# Patient Record
Sex: FEMALE | Race: WHITE | NOT HISPANIC OR LATINO | Employment: OTHER | URBAN - METROPOLITAN AREA
[De-identification: names, ages, dates, MRNs, and addresses within clinical notes are randomized per-mention and may not be internally consistent; named-entity substitution may affect disease eponyms.]

---

## 2020-08-25 LAB — HBA1C MFR BLD HPLC: 10.1 %

## 2020-10-01 ENCOUNTER — TELEPHONE (OUTPATIENT)
Dept: INTERNAL MEDICINE CLINIC | Facility: CLINIC | Age: 80
End: 2020-10-01

## 2020-10-13 ENCOUNTER — TELEPHONE (OUTPATIENT)
Dept: INTERNAL MEDICINE CLINIC | Facility: CLINIC | Age: 80
End: 2020-10-13

## 2020-11-11 PROBLEM — I10 ESSENTIAL HYPERTENSION: Status: ACTIVE | Noted: 2020-11-11

## 2020-11-11 PROBLEM — E78.00 PURE HYPERCHOLESTEROLEMIA: Status: ACTIVE | Noted: 2020-11-11

## 2020-11-11 RX ORDER — LISINOPRIL 5 MG/1
5 TABLET ORAL DAILY
COMMUNITY

## 2020-11-11 RX ORDER — GLYBURIDE 2.5 MG/1
7.5 TABLET ORAL DAILY
COMMUNITY
End: 2020-11-12 | Stop reason: SDUPTHER

## 2020-11-11 RX ORDER — FENOFIBRATE 145 MG/1
145 TABLET, COATED ORAL DAILY
COMMUNITY

## 2020-11-12 ENCOUNTER — OFFICE VISIT (OUTPATIENT)
Dept: INTERNAL MEDICINE CLINIC | Facility: CLINIC | Age: 80
End: 2020-11-12
Payer: COMMERCIAL

## 2020-11-12 VITALS
HEIGHT: 64 IN | TEMPERATURE: 98.3 F | DIASTOLIC BLOOD PRESSURE: 80 MMHG | BODY MASS INDEX: 30.05 KG/M2 | WEIGHT: 176 LBS | HEART RATE: 70 BPM | SYSTOLIC BLOOD PRESSURE: 140 MMHG

## 2020-11-12 DIAGNOSIS — E78.00 PURE HYPERCHOLESTEROLEMIA: ICD-10-CM

## 2020-11-12 DIAGNOSIS — I10 ESSENTIAL HYPERTENSION: ICD-10-CM

## 2020-11-12 DIAGNOSIS — E11.9 TYPE 2 DIABETES MELLITUS WITHOUT COMPLICATION, WITHOUT LONG-TERM CURRENT USE OF INSULIN (HCC): Primary | ICD-10-CM

## 2020-11-12 PROCEDURE — 1036F TOBACCO NON-USER: CPT | Performed by: INTERNAL MEDICINE

## 2020-11-12 PROCEDURE — 99213 OFFICE O/P EST LOW 20 MIN: CPT | Performed by: INTERNAL MEDICINE

## 2020-11-12 PROCEDURE — 3079F DIAST BP 80-89 MM HG: CPT | Performed by: INTERNAL MEDICINE

## 2020-11-12 PROCEDURE — 3077F SYST BP >= 140 MM HG: CPT | Performed by: INTERNAL MEDICINE

## 2020-11-12 RX ORDER — BLOOD SUGAR DIAGNOSTIC
STRIP MISCELLANEOUS
COMMUNITY
Start: 2020-11-03 | End: 2020-12-21

## 2020-11-12 RX ORDER — BLOOD-GLUCOSE METER
EACH MISCELLANEOUS
COMMUNITY
Start: 2020-08-11

## 2020-11-12 RX ORDER — GLYBURIDE 2.5 MG/1
7.5 TABLET ORAL DAILY
Qty: 90 TABLET | Refills: 2 | Status: SHIPPED | OUTPATIENT
Start: 2020-11-12 | End: 2021-05-03

## 2020-11-12 RX ORDER — LANCETS
EACH MISCELLANEOUS
COMMUNITY
Start: 2020-11-03

## 2020-12-20 DIAGNOSIS — E11.9 TYPE 2 DIABETES MELLITUS WITHOUT COMPLICATION, WITHOUT LONG-TERM CURRENT USE OF INSULIN (HCC): Primary | ICD-10-CM

## 2020-12-21 RX ORDER — BLOOD SUGAR DIAGNOSTIC
STRIP MISCELLANEOUS
Qty: 100 EACH | Refills: 2 | Status: SHIPPED | OUTPATIENT
Start: 2020-12-21

## 2020-12-23 LAB — HBA1C MFR BLD HPLC: 8.7 %

## 2021-05-02 DIAGNOSIS — E11.9 TYPE 2 DIABETES MELLITUS WITHOUT COMPLICATION, WITHOUT LONG-TERM CURRENT USE OF INSULIN (HCC): ICD-10-CM

## 2021-05-03 RX ORDER — GLYBURIDE 2.5 MG/1
TABLET ORAL
Qty: 270 TABLET | Refills: 3 | Status: SHIPPED | OUTPATIENT
Start: 2021-05-03

## 2022-02-14 ENCOUNTER — TELEPHONE (OUTPATIENT)
Dept: INTERNAL MEDICINE CLINIC | Facility: CLINIC | Age: 82
End: 2022-02-14

## 2022-02-14 NOTE — TELEPHONE ENCOUNTER
Patient transferred to [facility]  Internal Medicine, See CARMINA request in Encounters tab dated 12/14/21, please remove Dr Aleks Hudson as PCP

## 2022-05-26 NOTE — TELEPHONE ENCOUNTER
05/26/22 3:55 PM        Thank you for your request  Your request has been received, reviewed, and the patient chart updated  The PCP has successfully been removed with a patient attribution note  This message will now be completed          Thank you  Raghu Womack

## 2024-10-07 LAB
BUN SERPL-MCNC: 20 MG/DL (ref 7–17)
CALCIUM SERPL-MCNC: 9.4 MG/DL (ref 8.4–10.2)
CHLORIDE SERPL-SCNC: 108 MMOL/L (ref 98–107)
CO2 SERPL-SCNC: 24 MMOL/L (ref 22–30)
EGFR: > 60
ERYTHROCYTE [DISTWIDTH] IN BLOOD BY AUTOMATED COUNT: 13.2 % (ref 11.5–14.5)
GLUCOSE SERPL-MCNC: 115 MG/DL (ref 70–99)
HCT VFR BLD AUTO: 41.2 % (ref 37–47)
HGB BLD-MCNC: 13.7 G/DL (ref 12–16)
MCHC RBC AUTO-ENTMCNC: 33.3 G/DL (ref 33–37)
MCV RBC AUTO: 88.4 FL (ref 81–99)
NRBC BLD AUTO-RTO: 0 %
PLATELET # BLD AUTO: 184 10^3/UL (ref 130–400)
POTASSIUM SERPL-SCNC: 3.8 MMOL/L (ref 3.5–5.1)
SODIUM SERPL-SCNC: 143 MMOL/L (ref 135–145)

## 2024-10-14 ENCOUNTER — HOSPITAL ENCOUNTER (OUTPATIENT)
Dept: HOSPITAL 99 - SDS | Age: 84
LOS: 1 days | Discharge: HOME | End: 2024-10-15
Payer: COMMERCIAL

## 2024-10-14 VITALS — OXYGEN SATURATION: 2 %

## 2024-10-14 VITALS — SYSTOLIC BLOOD PRESSURE: 131 MMHG | DIASTOLIC BLOOD PRESSURE: 60 MMHG | RESPIRATION RATE: 13 BRPM

## 2024-10-14 VITALS — RESPIRATION RATE: 11 BRPM | DIASTOLIC BLOOD PRESSURE: 64 MMHG | SYSTOLIC BLOOD PRESSURE: 146 MMHG

## 2024-10-14 VITALS — DIASTOLIC BLOOD PRESSURE: 61 MMHG | SYSTOLIC BLOOD PRESSURE: 133 MMHG | RESPIRATION RATE: 11 BRPM

## 2024-10-14 VITALS — RESPIRATION RATE: 16 BRPM | DIASTOLIC BLOOD PRESSURE: 57 MMHG | SYSTOLIC BLOOD PRESSURE: 108 MMHG

## 2024-10-14 VITALS — DIASTOLIC BLOOD PRESSURE: 70 MMHG | SYSTOLIC BLOOD PRESSURE: 151 MMHG | RESPIRATION RATE: 16 BRPM

## 2024-10-14 VITALS — DIASTOLIC BLOOD PRESSURE: 68 MMHG | SYSTOLIC BLOOD PRESSURE: 138 MMHG | RESPIRATION RATE: 17 BRPM

## 2024-10-14 VITALS — DIASTOLIC BLOOD PRESSURE: 82 MMHG | SYSTOLIC BLOOD PRESSURE: 157 MMHG | RESPIRATION RATE: 16 BRPM

## 2024-10-14 VITALS — DIASTOLIC BLOOD PRESSURE: 82 MMHG | OXYGEN SATURATION: 2 % | SYSTOLIC BLOOD PRESSURE: 157 MMHG

## 2024-10-14 VITALS — DIASTOLIC BLOOD PRESSURE: 70 MMHG | RESPIRATION RATE: 21 BRPM | SYSTOLIC BLOOD PRESSURE: 137 MMHG

## 2024-10-14 VITALS — DIASTOLIC BLOOD PRESSURE: 64 MMHG | SYSTOLIC BLOOD PRESSURE: 136 MMHG | RESPIRATION RATE: 12 BRPM

## 2024-10-14 VITALS — DIASTOLIC BLOOD PRESSURE: 64 MMHG | SYSTOLIC BLOOD PRESSURE: 142 MMHG

## 2024-10-14 VITALS — BODY MASS INDEX: 27.8 KG/M2

## 2024-10-14 VITALS — SYSTOLIC BLOOD PRESSURE: 146 MMHG | RESPIRATION RATE: 18 BRPM | DIASTOLIC BLOOD PRESSURE: 64 MMHG

## 2024-10-14 VITALS — SYSTOLIC BLOOD PRESSURE: 137 MMHG | DIASTOLIC BLOOD PRESSURE: 66 MMHG

## 2024-10-14 VITALS — SYSTOLIC BLOOD PRESSURE: 135 MMHG | DIASTOLIC BLOOD PRESSURE: 67 MMHG | RESPIRATION RATE: 18 BRPM

## 2024-10-14 DIAGNOSIS — N36.41: ICD-10-CM

## 2024-10-14 DIAGNOSIS — N39.3: ICD-10-CM

## 2024-10-14 DIAGNOSIS — N95.8: ICD-10-CM

## 2024-10-14 DIAGNOSIS — N81.2: Primary | ICD-10-CM

## 2024-10-14 LAB
APTT PPP: 30.9 SEC (ref 23.4–35)
BUN SERPL-MCNC: 16 MG/DL (ref 7–17)
CALCIUM SERPL-MCNC: 8.6 MG/DL (ref 8.4–10.2)
CHLORIDE SERPL-SCNC: 108 MMOL/L (ref 98–107)
CO2 SERPL-SCNC: 23 MMOL/L (ref 22–30)
EGFR: > 60
ERYTHROCYTE [DISTWIDTH] IN BLOOD BY AUTOMATED COUNT: 13.2 % (ref 11.5–14.5)
ESTIMATED CREATININE CLEARANCE: 58 ML/MIN
GLUCOSE - POINT OF CARE: 177 MG/DL (ref 70–99)
GLUCOSE - POINT OF CARE: 188 MG/DL (ref 70–99)
GLUCOSE - POINT OF CARE: 207 MG/DL (ref 70–99)
GLUCOSE - POINT OF CARE: 223 MG/DL (ref 70–99)
GLUCOSE - POINT OF CARE: 232 MG/DL (ref 70–99)
GLUCOSE - POINT OF CARE: 328 MG/DL (ref 70–99)
GLUCOSE SERPL-MCNC: 234 MG/DL (ref 70–99)
HBA1C MFR BLD: 6.8 % (ref 4–5.6)
HCT VFR BLD AUTO: 33.6 % (ref 37–47)
HCT VFR BLD AUTO: 35 % (ref 37–47)
HGB BLD-MCNC: 11.6 G/DL (ref 12–16)
HGB BLD-MCNC: 12.2 G/DL (ref 12–16)
INR PPP: 1.17
MCHC RBC AUTO-ENTMCNC: 34.5 G/DL (ref 33–37)
MCV RBC AUTO: 85.9 FL (ref 81–99)
PLATELET # BLD AUTO: 148 10^3/UL (ref 130–400)
POTASSIUM SERPL-SCNC: 4.3 MMOL/L (ref 3.5–5.1)
PROTHROMBIN TIME: 14.7 SEC (ref 11.4–14.6)
SODIUM SERPL-SCNC: 140 MMOL/L (ref 135–145)

## 2024-10-14 PROCEDURE — 57120 COLPOCLEISIS LE FORT TYPE: CPT

## 2024-10-14 PROCEDURE — 57250 REPAIR RECTUM & VAGINA: CPT

## 2024-10-14 RX ADMIN — INSULIN ASPART 3 UNITS: 100 INJECTION, SOLUTION INTRAVENOUS; SUBCUTANEOUS at 23:02

## 2024-10-14 RX ADMIN — INSULIN ASPART: 100 INJECTION, SOLUTION INTRAVENOUS; SUBCUTANEOUS at 12:53

## 2024-10-14 RX ADMIN — SODIUM CHLORIDE, SODIUM ACETATE ANHYDROUS, SODIUM GLUCONATE, POTASSIUM CHLORIDE, AND MAGNESIUM CHLORIDE 1000: 526; 222; 502; 37; 30 INJECTION, SOLUTION INTRAVENOUS at 06:33

## 2024-10-14 RX ADMIN — INSULIN ASPART 6 UNITS: 100 INJECTION, SOLUTION INTRAVENOUS; SUBCUTANEOUS at 17:54

## 2024-10-14 RX ADMIN — SODIUM CHLORIDE, SODIUM ACETATE ANHYDROUS, SODIUM GLUCONATE, POTASSIUM CHLORIDE, AND MAGNESIUM CHLORIDE 1000: 526; 222; 502; 37; 30 INJECTION, SOLUTION INTRAVENOUS at 20:44

## 2024-10-14 RX ADMIN — SODIUM CHLORIDE, SODIUM ACETATE ANHYDROUS, SODIUM GLUCONATE, POTASSIUM CHLORIDE, AND MAGNESIUM CHLORIDE 1000: 526; 222; 502; 37; 30 INJECTION, SOLUTION INTRAVENOUS at 11:19

## 2024-10-14 RX ADMIN — INSULIN ASPART 3 UNITS: 100 INJECTION, SOLUTION INTRAVENOUS; SUBCUTANEOUS at 13:18

## 2024-10-14 RX ADMIN — INSULIN ASPART 7 UNITS: 100 INJECTION, SOLUTION INTRAVENOUS; SUBCUTANEOUS at 17:52

## 2024-10-14 NOTE — W.SUR.POST
"Surgical Immediate Post Op"~"Note"~"-: "~"Pre Op Diagnosis:  Uterovaginal prolapse"~"Post Op Diagnosis:  Uterovaginal prolapse"~"Procedure Performed:  Lefort colpocleisis, posterior colporrhaphy, perineoplasty, cystoscopy"~"Primary Surgeon:  Blake Wilks MD"~"Secondary Surgeons:   N/A"~"Anesthesia:  General "~"Estimated Blood Loss: 400cc   "~"Drains/Shunts:  Lee catheter"~"Specimens/Cultures:  None "~"Doppler/Duplex/Angio (Y/N):  N "~"Complications:  Increased EBL, TXA x 1 gram"~"Operative Findings:  Stage 3 pelvic organ prolapse"

## 2024-10-14 NOTE — PN.DE.MGMTRT
"Insulin Management"~"- -"~"-: "~"10/14/2024: Diabetes Management Consult"~"84 year old female with PMH: Essential HTN, Mixed hyperlipidemia, Uterovaginal prolapse and T2DM, admitted for Leforte colpocleisis, posterior colporrhaphy, perineoplasty, cystoscopy. Prior to admission was taking Lantus 10 units daily and NovoLog 8 "~"units AC PTA. Use CGM- Griffin 2 for glucose monitoring at home. "~"Last A1C was 16.4% in 5/2023."~"Pt awake, A/O x3, resting in bed from PACU, offers no complains, able to discuss diabetes mgt. "~"Current  Diabetes regimen ordered is corrective insulin vivian"~"Pt reports that she took her Lantus dose of 10 units this morning."~"She is currently NPO with a blood sugar of 234. Will give 3 units NovoLog per SS"~"Pt is ordered a diet, 1800 lindy to start at dinner time. "~"Will start AC NovoLog at reduced dose of 6 units and moderate corrective insulin with meals. "~"Cont Lantus 10 units daily in AM. Update A1C. Will follow."~"Discussed with Nurse at bedside."~"Diabetes History"~"- -"~"Type of Diabetes: 2 requiring insulin"~"-: "~"Pre-Admission Diabetes Regimen"~" 	10/14/24"~" 	10:26"~"Creatinine	 0.7"~"Insulin Pump Settings"~"IP Diabetes Regimen"~" 	10/14/24	10/14/24	10/14/24"~" 	06:29	08:33	10:25"~"Glucose			"~"POC Glucose	 177 H	 188 H	 207 H"~" 	10/14/24	10/14/24"~" 	10:26	12:30"~"Glucose	 234 H	"~"POC Glucose		 232 H"~"Patient Education" Statement Selected

## 2024-10-14 NOTE — PTCARENOTE
"recieved pt from PACU, aaox3, pleasant, denies pain, rothman drianing orange-tinted urine without diff, UO 400ML now, no active bleeding, AV pumps on, IV in Rt arm clean dry and intact, infusing IVFs as ordered, lungs clear, no c/o at this time, "~"family at bedside"

## 2024-10-15 VITALS — SYSTOLIC BLOOD PRESSURE: 113 MMHG | DIASTOLIC BLOOD PRESSURE: 58 MMHG | RESPIRATION RATE: 18 BRPM

## 2024-10-15 VITALS — RESPIRATION RATE: 18 BRPM | DIASTOLIC BLOOD PRESSURE: 60 MMHG | SYSTOLIC BLOOD PRESSURE: 133 MMHG

## 2024-10-15 LAB
BUN SERPL-MCNC: 14 MG/DL (ref 7–17)
CALCIUM SERPL-MCNC: 8.8 MG/DL (ref 8.4–10.2)
CHLORIDE SERPL-SCNC: 107 MMOL/L (ref 98–107)
CO2 SERPL-SCNC: 23 MMOL/L (ref 22–30)
EGFR: > 60
ERYTHROCYTE [DISTWIDTH] IN BLOOD BY AUTOMATED COUNT: 13.2 % (ref 11.5–14.5)
ESTIMATED CREATININE CLEARANCE: 67 ML/MIN
GLUCOSE - POINT OF CARE: 142 MG/DL (ref 70–99)
GLUCOSE - POINT OF CARE: 165 MG/DL (ref 70–99)
GLUCOSE - POINT OF CARE: 201 MG/DL (ref 70–99)
GLUCOSE SERPL-MCNC: 150 MG/DL (ref 70–99)
HCT VFR BLD AUTO: 31.3 % (ref 37–47)
HGB BLD-MCNC: 10.7 G/DL (ref 12–16)
MCHC RBC AUTO-ENTMCNC: 34.2 G/DL (ref 33–37)
MCV RBC AUTO: 85.8 FL (ref 81–99)
PLATELET # BLD AUTO: 143 10^3/UL (ref 130–400)
POTASSIUM SERPL-SCNC: 4.2 MMOL/L (ref 3.5–5.1)
SODIUM SERPL-SCNC: 139 MMOL/L (ref 135–145)

## 2024-10-15 RX ADMIN — INSULIN ASPART 3 UNITS: 100 INJECTION, SOLUTION INTRAVENOUS; SUBCUTANEOUS at 00:40

## 2024-10-15 RX ADMIN — SODIUM CHLORIDE, SODIUM ACETATE ANHYDROUS, SODIUM GLUCONATE, POTASSIUM CHLORIDE, AND MAGNESIUM CHLORIDE 1000: 526; 222; 502; 37; 30 INJECTION, SOLUTION INTRAVENOUS at 05:02

## 2024-10-15 RX ADMIN — ACETAMINOPHEN 650 MG: 325 TABLET ORAL at 05:14

## 2024-10-15 RX ADMIN — INSULIN ASPART: 100 INJECTION, SOLUTION INTRAVENOUS; SUBCUTANEOUS at 07:52

## 2024-10-15 RX ADMIN — INSULIN GLARGINE 0.1 UNITS: 100 INJECTION, SOLUTION SUBCUTANEOUS at 07:50

## 2024-10-15 RX ADMIN — INSULIN ASPART 6 UNITS: 100 INJECTION, SOLUTION INTRAVENOUS; SUBCUTANEOUS at 07:51

## 2024-10-15 NOTE — PTCARENOTE
rothman catheter and vaginal packing d/c @ 0500 per physician order. Scant brownish pink vaginal d/c noted. Patient OOB to bathroom with assist @ 0610, voided 125 ml urine, PVR 19ml.

## 2024-10-15 NOTE — PTCARENOTE
repeat accucheck 165 @ 0244. Patients own meter reading 165 as well. patient with no complaints. resting quietly.

## 2024-10-15 NOTE — W.PN.GYN
"Today's Communication / Plan"~"-"~"-: "~"1. d/c home today"~"Physician Note"~"-"~"-: "~"Assessment and Plan:"~"83 yo woman POD 1 s/p lefort colpocleisis, posterior colporrhaphy and cystoscopy: doing well and meeting postop milestones. "~"1. Postoperative care"~"-hep lock iv"~"-regular diet"~"-dvt ppx: ambulation, scds"~"-cbc: stable at 10.7 this morning"~"-bmp: wnl"~"-uop: adequate"~"-voiding trial: voiding without difficulty"~"2. T2DM"~"-follow recommendations from Diabetic NP"~"-Restart home regimen at discharge"~"3. Dispo"~"-d/c home"~"Subjective:"~"no acute complaints. pain well controlled. ambulating to chair. voiding. denies fevers/chills, nausea/vomitting, chest pain, sob."~"Objective:"~"Intake and Output"~"	10/13/24	10/14/24	10/15/24	10/16/24"~"	06:59	06:59	06:59	06:59"~"Intake Total			2810 / 2810	"~"Output Total			1615 / 1615	"~"Balance			1195 / 1195	"~"Intake:				"~"  Oral fluids			172 / 172	"~"  IV fluids (Total)			2638 / 2638	"~"    Norm			125 / 125	"~"    Normosol-R/Plasmalyte-A 1,000			138 / 138	"~"    ml @ 125 mls/hr IV .Q8H JOAO Rx#				"~"    :[id number]				"~"Output:				"~"  Urine, Lee			1490 / 1490	"~"  Urine, Voided			125 / 125	"~"Vital Signs"~"Temp	Pulse	Resp	BP	Pulse Ox"~" 97.7 F 	 87 	 18 	 113/58 	 97 "~" 10/15/24 05:00	 10/15/24 05:00	 10/15/24 05:00	 10/15/24 05:00	 10/14/24 20:00"~"Lab Results"~"10/15/24 05:39 "~"10/15/24 05:39 "~"Exam:"~"ABdomen: soft, nontender, non-distended"~": minimal spotting"

## 2024-10-15 NOTE — PN.DE.MGMTRT
"Insulin Management"~"- -"~"-: "~"10/15/2024: Diabetes Management Consult Follow up"~"Patient admitted for Leforte colpocleisis, posterior colporrhaphy, perineoplasty, cystoscopy. PMH: Essential HTN, Mixed hyperlipidemia, Uterovaginal prolapse and T2DM.  Prior to admission was taking Lantus 10 units daily and NovoLog 8 units AC. Use "~"CGM- Griffin 2 for glucose monitoring at home. "~"A1C on admission 6.8%, cr .6, eGFR &gt;60."~"Pt awake, A/O x3, offers no complains, able to discuss diabetes mgt. "~"10/14 post operatively NPO until dinner, novolog 6 units AC resumed. HS glucose 165."~"10/15  Fasting glucose 142, lantus 10 units given with AC NovoLog 6 units and moderate corrective insulin. "~"Will make no change to regimen at this time."~"Diabetes History"~"- -"~"Type of Diabetes: 2 requiring insulin"~"-: "~"Pre-Admission Diabetes Regimen"~" 	10/14/24	10/15/24"~" 	10:26	05:39"~"Creatinine	 0.7	 0.6"~"Lab Results"~"Hemoglobin A1c	 6.8 % (4.0-5.6)  H 	10/14/24  10:26    "~"Insulin Pump Settings"~"IP Diabetes Regimen"~" 	10/14/24	10/14/24	10/14/24"~" 	08:33	10:25	10:26"~"Glucose			 234 H"~"POC Glucose	 188 H	 207 H	"~" 	10/14/24	10/14/24	10/14/24"~" 	12:30	17:36	22:10"~"Glucose			"~"POC Glucose	 232 H	 328 H	 223 H"~" 	10/15/24	10/15/24	10/15/24"~" 	00:08	02:44	05:39"~"Glucose			 150 H"~"POC Glucose	 201 H	 165 H	"~" 	10/15/24"~" 	07:36"~"Glucose	"~"POC Glucose	 142 H"~"Patient Education"

## 2024-10-15 NOTE — PTCARENOTE
"Patient called nurse to room @ 0000. Patient verbalized her own glucometer is reading her blood sugar high @ 238. Nurse obtained accucheck via hospital meter, result 201 @ 0008. Patient concerned regarding blood sugar levels. Call placed to Nancy "~"Julio SMITH regarding blood sugar levels and insulin received since 2200. Order for 3 units Novolog  insulin ordered, given @ 0040. Will recheck blood sugar level via accucheck in 2 hours as ordered."

## 2024-12-19 ENCOUNTER — HOSPITAL ENCOUNTER (OUTPATIENT)
Dept: HOSPITAL 99 - HWRAD | Age: 84
End: 2024-12-19
Payer: COMMERCIAL

## 2024-12-19 DIAGNOSIS — N20.0: Primary | ICD-10-CM

## 2025-05-21 ENCOUNTER — HOSPITAL ENCOUNTER (OUTPATIENT)
Dept: HOSPITAL 99 - HWRAD | Age: 85
End: 2025-05-21
Payer: COMMERCIAL

## 2025-05-21 DIAGNOSIS — R10.9: Primary | ICD-10-CM
